# Patient Record
(demographics unavailable — no encounter records)

---

## 2025-01-07 NOTE — PHYSICAL EXAM
[Well Nourished] : well nourished [Healthy Appearing] : healthy appearing [Well Developed] : well developed [Normal] : the sclera and conjunctiva were normal, extraocular movements were intact, pupils were equal in size, round, and reactive to light

## 2025-01-07 NOTE — HISTORY OF PRESENT ILLNESS
[FreeTextEntry1] : 76 year old here moved here from [place] lives with daughter - in mother in law cottage. moved in .  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  some memory loss recently  24 reviewed all labs gave proxy needs dexa wants mammo  had flu vaccine had covid vaccine 24 needs pneumonia vaccine  being social, eating well better still driving some does short drives - usually does not drive would like to volunteer at [employer] hard to   find grocery store though

## 2025-01-07 NOTE — ASSESSMENT
[FreeTextEntry1] : memory loss - was having trouble with independence in [place]  mammo dexa MMS - 22/30  cognitive rehab neuropsych testing driving evaluation  get blood tests brain  imaging  BP controlled  1/7/25   lives on [place] acres with daughter  son in law is a daphne

## 2025-05-06 NOTE — PHYSICAL EXAM
[Well Nourished] : well nourished [Healthy Appearing] : healthy appearing [Well Developed] : well developed [No Oral Pallor] : no oral pallor [Normal Outer Ear/Nose] : the ears and nose were normal in appearance [Normal Appearance] : the appearance of the neck was normal [No Neck Mass] : no neck mass was observed [Supple] : the neck was supple [No Respiratory Distress] : no respiratory distress [No Acc Muscle Use] : no accessory muscle use [Respiration, Rhythm And Depth] : normal respiratory rhythm and effort [Normal S1, S2] : normal S1 and S2 [Heart Rate And Rhythm] : heart rate was normal and rhythm regular [Edema] : edema was not present [Abdomen Tenderness] : non-tender [Abdomen Soft] : soft [Normal] : normal affect and normal mood [Normal Affect] : the affect was normal [Normal Mood] : the mood was normal

## 2025-05-06 NOTE — HISTORY OF PRESENT ILLNESS
[FreeTextEntry1] : 76 year old here moved here from [place] lives with daughter - in [place] cottage. moved in .  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  some memory loss recently  24 reviewed all labs gave proxy needs dexa wants mammo  had flu vaccine had covid vaccine 24 needs pneumonia vaccine  being social, eating well better still driving some does short drives - usually does not drive would like to volunteer at [employer] hard to   find grocery store though   25 pt had dexa - osteopenia fem neck vit D 28.3